# Patient Record
Sex: FEMALE | Race: WHITE | NOT HISPANIC OR LATINO | Employment: OTHER
[De-identification: names, ages, dates, MRNs, and addresses within clinical notes are randomized per-mention and may not be internally consistent; named-entity substitution may affect disease eponyms.]

---

## 2019-11-06 ENCOUNTER — RECORDS - HEALTHEAST (OUTPATIENT)
Dept: ADMINISTRATIVE | Facility: OTHER | Age: 79
End: 2019-11-06

## 2019-11-18 ENCOUNTER — HOSPITAL ENCOUNTER (OUTPATIENT)
Dept: MRI IMAGING | Facility: HOSPITAL | Age: 79
Discharge: HOME | End: 2019-11-18
Attending: PSYCHIATRY & NEUROLOGY

## 2019-11-18 ENCOUNTER — RECORDS - HEALTHEAST (OUTPATIENT)
Dept: LAB | Facility: HOSPITAL | Age: 79
End: 2019-11-18

## 2019-11-18 DIAGNOSIS — G30.9 ALZHEIMER'S DISEASE (H): ICD-10-CM

## 2019-11-18 DIAGNOSIS — F02.80 ALZHEIMER'S DISEASE (H): ICD-10-CM

## 2019-11-18 LAB
ANION GAP SERPL CALCULATED.3IONS-SCNC: 8 MMOL/L (ref 5–18)
BUN SERPL-MCNC: 20 MG/DL (ref 8–28)
CALCIUM SERPL-MCNC: 9.5 MG/DL (ref 8.5–10.5)
CHLORIDE BLD-SCNC: 109 MMOL/L (ref 98–107)
CO2 SERPL-SCNC: 26 MMOL/L (ref 22–31)
CREAT SERPL-MCNC: 0.95 MG/DL (ref 0.6–1.1)
GFR SERPL CREATININE-BSD FRML MDRD: 57 ML/MIN/1.73M2
GLUCOSE BLD-MCNC: 78 MG/DL (ref 70–125)
POTASSIUM BLD-SCNC: 4.3 MMOL/L (ref 3.5–5)
SODIUM SERPL-SCNC: 143 MMOL/L (ref 136–145)
TSH SERPL DL<=0.005 MIU/L-ACNC: 1.35 UIU/ML (ref 0.3–5)
VIT B12 SERPL-MCNC: 225 PG/ML (ref 213–816)

## 2020-01-08 ENCOUNTER — RECORDS - HEALTHEAST (OUTPATIENT)
Dept: LAB | Facility: HOSPITAL | Age: 80
End: 2020-01-08

## 2020-01-08 LAB — VIT B12 SERPL-MCNC: 1617 PG/ML (ref 213–816)

## 2021-01-01 ENCOUNTER — RECORDS - HEALTHEAST (OUTPATIENT)
Dept: LAB | Facility: CLINIC | Age: 81
End: 2021-01-01

## 2021-01-01 ENCOUNTER — LAB REQUISITION (OUTPATIENT)
Dept: LAB | Facility: CLINIC | Age: 81
End: 2021-01-01
Payer: MEDICARE

## 2021-01-01 ENCOUNTER — ASSISTED LIVING VISIT (OUTPATIENT)
Dept: GERIATRICS | Facility: CLINIC | Age: 81
End: 2021-01-01
Payer: MEDICARE

## 2021-01-01 ENCOUNTER — TELEPHONE (OUTPATIENT)
Dept: GERIATRICS | Facility: CLINIC | Age: 81
End: 2021-01-01

## 2021-01-01 VITALS
HEART RATE: 66 BPM | WEIGHT: 187 LBS | BODY MASS INDEX: 31.16 KG/M2 | DIASTOLIC BLOOD PRESSURE: 64 MMHG | OXYGEN SATURATION: 96 % | RESPIRATION RATE: 17 BRPM | SYSTOLIC BLOOD PRESSURE: 136 MMHG | TEMPERATURE: 98.5 F | HEIGHT: 65 IN

## 2021-01-01 VITALS
HEART RATE: 68 BPM | HEIGHT: 65 IN | TEMPERATURE: 97.2 F | SYSTOLIC BLOOD PRESSURE: 135 MMHG | BODY MASS INDEX: 24.16 KG/M2 | RESPIRATION RATE: 18 BRPM | WEIGHT: 145 LBS | DIASTOLIC BLOOD PRESSURE: 64 MMHG

## 2021-01-01 VITALS
HEART RATE: 62 BPM | DIASTOLIC BLOOD PRESSURE: 72 MMHG | SYSTOLIC BLOOD PRESSURE: 123 MMHG | WEIGHT: 164 LBS | HEIGHT: 65 IN | RESPIRATION RATE: 18 BRPM | OXYGEN SATURATION: 96 % | TEMPERATURE: 98.9 F | BODY MASS INDEX: 27.32 KG/M2

## 2021-01-01 VITALS — OXYGEN SATURATION: 94 % | BODY MASS INDEX: 27.32 KG/M2 | TEMPERATURE: 97.4 F | HEIGHT: 65 IN | WEIGHT: 164 LBS

## 2021-01-01 DIAGNOSIS — R44.3 HALLUCINATIONS: ICD-10-CM

## 2021-01-01 DIAGNOSIS — E03.9 HYPOTHYROIDISM, UNSPECIFIED TYPE: ICD-10-CM

## 2021-01-01 DIAGNOSIS — R79.89 ELEVATED LFTS: ICD-10-CM

## 2021-01-01 DIAGNOSIS — G30.1 LATE ONSET ALZHEIMER'S DISEASE WITH BEHAVIORAL DISTURBANCE (H): Primary | ICD-10-CM

## 2021-01-01 DIAGNOSIS — Z86.12 HISTORY OF POLIOMYELITIS: ICD-10-CM

## 2021-01-01 DIAGNOSIS — G30.1 LATE ONSET ALZHEIMER'S DISEASE WITH BEHAVIORAL DISTURBANCE (H): ICD-10-CM

## 2021-01-01 DIAGNOSIS — F02.818 LATE ONSET ALZHEIMER'S DISEASE WITH BEHAVIORAL DISTURBANCE (H): ICD-10-CM

## 2021-01-01 DIAGNOSIS — F02.818 LATE ONSET ALZHEIMER'S DISEASE WITH BEHAVIORAL DISTURBANCE (H): Primary | ICD-10-CM

## 2021-01-01 DIAGNOSIS — Z76.89 ENCOUNTER TO ESTABLISH CARE: Primary | ICD-10-CM

## 2021-01-01 DIAGNOSIS — B91 LATE EFFECTS OF ACUTE POLIOMYELITIS: ICD-10-CM

## 2021-01-01 DIAGNOSIS — K59.01 SLOW TRANSIT CONSTIPATION: ICD-10-CM

## 2021-01-01 DIAGNOSIS — M62.81 GENERALIZED MUSCLE WEAKNESS: ICD-10-CM

## 2021-01-01 DIAGNOSIS — Z11.59 ENCOUNTER FOR SCREENING FOR OTHER VIRAL DISEASES: ICD-10-CM

## 2021-01-01 DIAGNOSIS — I10 ESSENTIAL HYPERTENSION: ICD-10-CM

## 2021-01-01 DIAGNOSIS — Z51.5 HOSPICE CARE PATIENT: ICD-10-CM

## 2021-01-01 DIAGNOSIS — E78.5 HYPERLIPIDEMIA, UNSPECIFIED HYPERLIPIDEMIA TYPE: ICD-10-CM

## 2021-01-01 DIAGNOSIS — F02.818 DEMENTIA ASSOCIATED WITH OTHER UNDERLYING DISEASE WITH BEHAVIORAL DISTURBANCE (H): ICD-10-CM

## 2021-01-01 DIAGNOSIS — I10 ESSENTIAL HYPERTENSION: Primary | ICD-10-CM

## 2021-01-01 DIAGNOSIS — N39.0 URINARY TRACT INFECTION WITHOUT HEMATURIA, SITE UNSPECIFIED: ICD-10-CM

## 2021-01-01 DIAGNOSIS — Z53.9 ERRONEOUS ENCOUNTER--DISREGARD: Primary | ICD-10-CM

## 2021-01-01 DIAGNOSIS — E03.9 HYPOTHYROIDISM, UNSPECIFIED: ICD-10-CM

## 2021-01-01 DIAGNOSIS — R56.9 SEIZURES (H): Primary | ICD-10-CM

## 2021-01-01 DIAGNOSIS — Z51.5 HOSPICE CARE PATIENT: Primary | ICD-10-CM

## 2021-01-01 LAB
SARS-COV-2 PCR COMMENT: NORMAL
SARS-COV-2 RNA RESP QL NAA+PROBE: NEGATIVE
SARS-COV-2 RNA SPEC QL NAA+PROBE: NEGATIVE
SARS-COV-2 VIRUS SPECIMEN SOURCE: NORMAL
T4 FREE SERPL-MCNC: 1.17 NG/DL (ref 0.7–1.8)
TSH SERPL DL<=0.005 MIU/L-ACNC: 1.99 UIU/ML (ref 0.3–5)

## 2021-01-01 PROCEDURE — U0003 INFECTIOUS AGENT DETECTION BY NUCLEIC ACID (DNA OR RNA); SEVERE ACUTE RESPIRATORY SYNDROME CORONAVIRUS 2 (SARS-COV-2) (CORONAVIRUS DISEASE [COVID-19]), AMPLIFIED PROBE TECHNIQUE, MAKING USE OF HIGH THROUGHPUT TECHNOLOGIES AS DESCRIBED BY CMS-2020-01-R: HCPCS | Mod: ORL | Performed by: NURSE PRACTITIONER

## 2021-01-01 PROCEDURE — 99358 PROLONG SERVICE W/O CONTACT: CPT | Performed by: NURSE PRACTITIONER

## 2021-01-01 PROCEDURE — P9604 ONE-WAY ALLOW PRORATED TRIP: HCPCS | Mod: ORL | Performed by: GENERAL ACUTE CARE HOSPITAL

## 2021-01-01 PROCEDURE — 84439 ASSAY OF FREE THYROXINE: CPT | Mod: ORL | Performed by: GENERAL ACUTE CARE HOSPITAL

## 2021-01-01 PROCEDURE — 84443 ASSAY THYROID STIM HORMONE: CPT | Mod: ORL | Performed by: GENERAL ACUTE CARE HOSPITAL

## 2021-01-01 PROCEDURE — U0005 INFEC AGEN DETEC AMPLI PROBE: HCPCS | Mod: ORL

## 2021-01-01 PROCEDURE — 36415 COLL VENOUS BLD VENIPUNCTURE: CPT | Mod: ORL | Performed by: GENERAL ACUTE CARE HOSPITAL

## 2021-01-01 RX ORDER — HALOPERIDOL 0.5 MG/1
0.5 TABLET ORAL DAILY PRN
COMMUNITY
Start: 2021-01-01 | End: 2021-01-01

## 2021-01-01 RX ORDER — MORPHINE SULFATE 30 MG/1
2.5 TABLET ORAL
COMMUNITY
Start: 2021-01-01

## 2021-01-01 RX ORDER — CITALOPRAM HYDROBROMIDE 10 MG/1
TABLET ORAL
Qty: 28 TABLET | Refills: 10 | Status: SHIPPED | OUTPATIENT
Start: 2021-01-01

## 2021-01-01 RX ORDER — HALOPERIDOL 0.5 MG/1
0.5 TABLET ORAL EVERY 4 HOURS PRN
Start: 2021-01-01

## 2021-01-01 RX ORDER — LISINOPRIL 10 MG/1
10 TABLET ORAL DAILY
COMMUNITY
End: 2021-01-01

## 2021-01-01 RX ORDER — CEFDINIR 300 MG/1
300 CAPSULE ORAL 2 TIMES DAILY
COMMUNITY
Start: 2021-01-01 | End: 2021-01-01

## 2021-01-01 RX ORDER — QUETIAPINE FUMARATE 50 MG/1
25 TABLET, FILM COATED ORAL 2 TIMES DAILY
COMMUNITY
Start: 2021-01-01 | End: 2021-01-01

## 2021-01-01 RX ORDER — LEVOTHYROXINE SODIUM 75 UG/1
37.5 TABLET ORAL DAILY
COMMUNITY
Start: 2021-01-01

## 2021-01-01 RX ORDER — LEVOTHYROXINE SODIUM 50 UG/1
50 TABLET ORAL DAILY
COMMUNITY
End: 2021-01-01

## 2021-01-01 RX ORDER — CITALOPRAM HYDROBROMIDE 10 MG/1
10 TABLET ORAL DAILY
COMMUNITY
Start: 2021-01-01 | End: 2021-01-01

## 2021-01-01 RX ORDER — NAPROXEN SODIUM 220 MG
440 TABLET ORAL AT BEDTIME
COMMUNITY
End: 2021-01-01

## 2021-01-01 RX ORDER — SENNA AND DOCUSATE SODIUM 50; 8.6 MG/1; MG/1
1 TABLET, FILM COATED ORAL DAILY
Start: 2021-01-01

## 2021-01-01 RX ORDER — TRAZODONE HYDROCHLORIDE 50 MG/1
25 TABLET, FILM COATED ORAL EVERY MORNING
COMMUNITY
Start: 2021-01-01 | End: 2021-01-01

## 2021-01-01 RX ORDER — DIVALPROEX SODIUM 125 MG/1
125 CAPSULE, COATED PELLETS ORAL 3 TIMES DAILY
COMMUNITY
Start: 2021-01-01

## 2021-01-01 RX ORDER — ACETAMINOPHEN 325 MG/1
650 TABLET ORAL 2 TIMES DAILY
COMMUNITY

## 2021-01-01 RX ORDER — QUETIAPINE FUMARATE 50 MG/1
TABLET, FILM COATED ORAL
Start: 2021-01-01 | End: 2021-01-01

## 2021-01-01 RX ORDER — MIRTAZAPINE 15 MG/1
TABLET, FILM COATED ORAL
Qty: 28 TABLET | Refills: 10 | Status: SHIPPED | OUTPATIENT
Start: 2021-01-01

## 2021-01-01 RX ORDER — ATORVASTATIN CALCIUM 20 MG/1
20 TABLET, FILM COATED ORAL DAILY
COMMUNITY
End: 2021-01-01

## 2021-01-01 RX ORDER — QUETIAPINE FUMARATE 50 MG/1
25 TABLET, FILM COATED ORAL 2 TIMES DAILY
Start: 2021-01-01

## 2021-01-01 RX ORDER — LEVOCARNITINE 330 MG/1
TABLET ORAL
Qty: 270 TABLET | Refills: 10 | Status: SHIPPED | OUTPATIENT
Start: 2021-01-01 | End: 2021-01-01

## 2021-01-01 RX ORDER — QUETIAPINE FUMARATE 50 MG/1
50 TABLET, FILM COATED ORAL 2 TIMES DAILY
COMMUNITY
End: 2021-01-01

## 2021-01-01 ASSESSMENT — MIFFLIN-ST. JEOR
SCORE: 1219.78
SCORE: 1133.6
SCORE: 1219.78
SCORE: 1324.11

## 2021-05-26 PROBLEM — N39.0 UTI (URINARY TRACT INFECTION): Status: ACTIVE | Noted: 2021-01-01

## 2021-05-26 PROBLEM — R53.83 LETHARGY: Status: ACTIVE | Noted: 2021-01-01

## 2021-05-26 PROBLEM — R40.0 SOMNOLENCE: Status: ACTIVE | Noted: 2021-01-01

## 2021-05-26 PROBLEM — R79.89 ELEVATED LFTS: Status: ACTIVE | Noted: 2021-01-01

## 2021-05-26 PROBLEM — Z86.0100 PERSONAL HISTORY OF COLONIC POLYPS: Status: ACTIVE | Noted: 2021-01-01

## 2021-05-26 PROBLEM — F02.818 DEMENTIA ASSOCIATED WITH OTHER UNDERLYING DISEASE WITH BEHAVIORAL DISTURBANCE (H): Status: ACTIVE | Noted: 2019-12-18

## 2021-05-28 NOTE — LETTER
5/28/2021        RE: Val Arnold  [facility]  [address]  [facility] [address]        [place] [facility]  PRIMARY CARE PROVIDER AND CLINIC:  KENDRA Ledbetter CNP, [address] [facility] [address] / [place] [address]  Chief Complaint   Patient presents with     Establish Care     [place] Medical Record Number:  [id number]  Place of Service where encounter took place:  [facility] [[id number]]      HPI:    HPI information obtained from: facility chart records, facility staff, patient report, [facility] chart review and Care Everywhere [facility] chart review.     Brief Summary of Hospital Course:   Val Arnold  is a 81 year old  (1940) female with a medical history of polio with right sided deficits, dementia, HTN, HLD and hypothyroidism. She was previously living at home with her spouse but family wanted a secure environment for her so she moved to [facility] in 2/2020 but due to the pandemic she went home with him with the home instead three times per week. She began refusing to change her clothes and shower so [facility] had her move back to the [facility] She has had hallucinations, resistive to cares, and combative with staff. She is currently followed by Dr. Bone with [facility] in which virtual visits are done on a monthly basis. She had an ED visit on 3/10/21 for presumed UTI but anitbiotics were stopped after culture returned negative. While at the AL on 5/25, she developed decreased level of consciousness and hypotension therefore was send to the [facility] for further work up where she was found to have a UTI and was treated with ceftriaxone then transitioned to orals for discharge. Hospitalization was complicated by agitation, requiring a 1:1 staff member. Antihypertensives were held due to hypotension. She returned back to the [facility] on 5/27/21.     Updates on Status Since Skilled nursing  "Admission:   Ms. Arnold was visited today while in her room, sleeping in the bed. She does not recall being in the hospital nor having a bladder infection. She was lying on her stomach, did not want to move to her back to allow for vitals and full assessment. States that she does not have pain, asks to be left alone and to have the ability to sleep. \"If you hurt me, I'm going to hurt your ears.\" Care attendant was in the room, reports that she is generally up by this time (0845) and has been eating in her room. There is urine on the floor and pants are soiled with urine. Per staff, this has been happening more lately. She is generally up and walking around but as of late, has been using a wheelchair.     Spoke with Madiha for 24 minutes. He reports that Val has not had a madiha psyc inpatient stay but javier PCP, Dr. Lora recommended a geripsych provider as outpatient to help with hallucinations. Hayley first saw Dr. Bone in January 2020 but then all visits were virtual after that due to the pandemic. Madiha has felt that Hayley was sleeping too much so depakote was cut in half last week. Per , he got her to the bathroom last night with hand held assist but she was walking okay one week ago. He has noted her to not been eating well for the past few days. He is here with her for lunch and evening meal due to her behaviors. He has been worried that she wouldn't be able to stay in the memory care if her behaviors weren't controlled so he is at the facility often as she is less agitated when he is around. He is open to medication changes and hopeful that she will be less agitated. He reports her to have been pleasant one minute then escalates quickly at times. She has anxiety as well. To his knowledge, she has never been on a selective serotonin reuptake inhibitor or mirtazapine. He thinks she sleeps well at night but is also sleeping throughout the day. We discussed decreasing the thyroid medication which he " agrees with, and rechecking the liver enzymes in the next couple of weeks to ensure they are trending downward. If they are not, we may need to stop the tylenol but due to the low dose she is on, should be okay at this time. We discussed stopping the naproxen due to bleeding and kidney risks, he agrees. He is not sure where her pain is but she has always had some discomfort related to her deficits secondary to having polio. He wishes for her to remain a FULL code at this time.     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  PAST SURGICAL HISTORY:   has no past surgical history on file.  FAMILY HISTORY: family history includes Alcoholism in her sister; Alzheimer Disease in her mother; Breast Cancer in her maternal grandmother; Heart Disease in her father; Obesity in her sister.  SOCIAL HISTORY:   reports that she has never smoked. She has never used smokeless tobacco. She reports previous alcohol use. She reports that she does not use drugs.    Post Discharge Medication Reconciliation Status: discharge medications reconciled and changed, per note/orders    Current Outpatient Medications   Medication Sig Dispense Refill     acetaminophen (TYLENOL) 325 MG tablet Take 650 mg by mouth 2 times daily And 650 mg twice daily as needed       atorvastatin (LIPITOR) 20 MG tablet Take 20 mg by  "mouth daily       cefdinir (OMNICEF) 300 MG capsule Take 300 mg by mouth 2 times daily       divalproex sodium delayed-release (DEPAKOTE SPRINKLE) 125 MG DR capsule Take 125 mg by mouth 3 times daily       levothyroxine (SYNTHROID/LEVOTHROID) 75 MCG tablet Take 0.5 tablets (37.5 mcg) by mouth daily       lisinopril (ZESTRIL) 10 MG tablet Take 10 mg by mouth daily       QUEtiapine (SEROQUEL) 50 MG tablet Take 50 mg by mouth 2 times daily And 50 mg once daily as needed       vitamin B-12 (CYANOCOBALAMIN) 500 MCG tablet Take 500 mcg by mouth daily         ROS:  Limited secondary to cognitive impairment but today pt reports as noted above.     Vitals:  /72   Pulse 62   Temp 98.9  F (37.2  C)   Resp 18   Ht 1.651 m (5' 5\")   Wt 74.4 kg (164 lb)   SpO2 96%   BMI 27.29 kg/m   as she was uncooperative.   Exam:  GENERAL APPEARANCE:  Alert, in no distress, uncooperative.   EYES: unable to visualize  ENT: hearing acuity intact  NECK: supple, symmetrical  RESP: no respiratory distress, Lung sounds clear, patient is on room air  CV:  rate and rhythm regular (heard from posterior). Edema trace in bilateral lower extremities. VASCULAR: warm extremities without open areas.  ABDOMEN: unable to assess  M/S:   Gait and station: per staff has been using the wheelchair, able to move all extremities   SKIN:  Inspection and palpation of skin and subcutaneous tissue: skin warm, dry and intact without rashes but exam is limited  NEURO: no speech deficits and able to follow directions, moves all extremities symmetrically  PSYCH:  insight, judgement, and memory impaired, affect and mood irritable    Lab/Diagnostic data:  From hospitalization on 5/26/21:  Mg 2.0  Na 144  K 3.9  Cl 113  Ca 8.3  Creatinine 0.79  BUN 19  Albumin 3.0  Protein 5.7    AST 62  TSH 0.64  HGB 11.4    MRI 2019:  INTRACRANIAL CONTENTS: No acute or subacute infarct. No mass, acute hemorrhage, or focal, extra-axial fluid collections. Scattered " nonspecific T2/FLAIR hyperintensities within the cerebral white matter most consistent with mild chronic microvascular   ischemic change. Moderate, diffuse cerebral atrophy is present. This is slightly more pronounced involving the parietal and temporal lobes but there is substantial frontal and occipital involvement as well. Bilateral hippocampal atrophy noted without   evidence of sclerosis or mass. Normal position of the cerebellar tonsils.       ASSESSMENT/PLAN:  Alzheimer's Dementia with psychosis  hallucinations  Is progressing and now severe. SLUMS is listed as 0/30 in the [facility] system but wonder if she wasn't able to participate. Followed by geriatric psychiatrist, Andrzej Bone. She was started on depakote and has had some sleepiness with it. As of late, trazadone has been administered prior to morning cares to help with combativeness but she continues to remain intermittently combative. She is incontinent of urine today and spouse noted her to be swallowing more slowly last night so wonder if she has acute delirium from the UTI and recent hospitalization?  I would like to try mirtazapine to help with some of the irritability so will call Dr. Bone.   --continue with seroquel 50 mg BID and once daily PRN  --depakote 125 mg TID  --trazadone 25 mg PO prior to morning cares  --continue with memory care assisted living for assistance with cares, meals and medications.   --would favor mirtazapine but will hold off on ordering until hearing back from Dr. Bone    Addendum:  Received a phone call from Dr. Brand office that she is okay with starting the mirtazapine.   --will discontinue trazadone  --start mirtazapine 15 mg daily  --she is due for a follow with psych on June 15th.     Urinary tract infection  Treated with ceftriaxone which was transitioned to cefdinir for discharge. She had incontinent episode which the UTI may be contributing to.  --continue with cefdinir 300 mg bID with last day on 6/1/21  --encourage  fluids    Hypertension  Hyperlipidemia  Unable to obtain blood pressure today due to uncooperative but lisinopril was held during the hospitalization due to soft pressures.   --will ask staff to obtain a BP in the next few days and adjust the lisinopril as needed but will keep it for now.   --continue with lisinopril 10 mg daily  --atorvastatin 20 mg daily  --Continue to monitor blood pressure and heart rate, adjust medications as needed.    Hypothyroidism  TSH of 0.64 on 5/25/21 which may be contributing to anxiety and combativeness.   --decrease levothyroxine to 37.5 mcg per day  --recheck TSH in 6 weeks    Elevated LFTS  Wonder if depakote is causing this. Will recheck and if needed, consider stopping the depakote.   --recheck on next lab day    Hx of Polio with right sided deficits  weakness  Unable to assess today but per staff, she is more weak which is also in part due to recent hospitalizations and UTI.   --will ask PT/OT to see her    Electronically signed by:  KENDRA Ledbetter CNP     [place]  NON-FACE TO FACE PROLONGED SERVICE    Val Arnold 1940    Date of Related Face to Face Service: 5/28/21    INTENT OF SERVICE  This is an extended evaluation of patient's specific problem.  The service relates to a recent or future E/M visit.  Documentation supports medical necessity, relates to ongoing patient management and documents start times and stop times.    Regarding the following diagnoses:     Encounter to establish care  Late onset Alzheimer's disease with behavioral disturbance (H)  Hallucinations  Urinary tract infection without hematuria, site unspecified  Late effects of acute poliomyelitis  Essential hypertension  Hyperlipidemia, unspecified hyperlipidemia type  Elevated LFTs  Generalized muscle weakness,   I spoke with Madiha who is Val's spouse from 9:01 to 9:25  * I coordinated care with Dr. Bone's nurse facility member.  (Start time 9:27 end time  9:34)    ASSESSMENT/PLAN  Spoke with Madiha for 24 minutes. He reports that Val has not had a madiha psyc inpatient stay but javier PCP, Dr. Lora recommended a geripsych provider as outpatient to help with hallucinations. Hayley first saw Dr. Bone in January 2020 but then all visits were virtual after that due to the pandemic. Madiha has felt that Hayley was sleeping too much so depakote was cut in half last week. Per , he got her to the bathroom last night with hand held assist but she was walking okay one week ago. He has noted her to not been eating well for the past few days. He is here with her for lunch and evening meal due to her behaviors. He has been worried that she wouldn't be able to stay in the memory care if her behaviors weren't controlled so he is at the facility often as she is less agitated when he is around. He is open to medication changes and hopeful that she will be less agitated. He reports her to have been pleasant one minute then escalates quickly at times. She has anxiety as well. To his knowledge, she has never been on a selective serotonin reuptake inhibitor or mirtazapine. He thinks she sleeps well at night but is also sleeping throughout the day. We discussed decreasing the thyroid medication which he agrees with, and rechecking the liver enzymes in the next couple of weeks to ensure they are trending downward. If they are not, we may need to stop the tylenol but due to the low dose she is on, should be okay at this time. We discussed stopping the naproxen due to bleeding and kidney risks, he agrees. He is not sure where her pain is but she has always had some discomfort related to her deficits secondary to having polio. He wishes for her to remain a FULL code at this time.     TIME  Total time spent with Non-Face to Face prolonged service 31 minutes.     Electronically signed by:  KENDRA Ledbetter CNP                            Sincerely,        KENDRA Ledbetter  CNP

## 2021-05-28 NOTE — PROGRESS NOTES
[place] [facility]  PRIMARY CARE PROVIDER AND CLINIC:  Milena Gusman, KENDRA CNP, [address] / [place] [address]  Chief Complaint   Patient presents with     [facility]     [place] Medical Record Number:  [id number]  Place of Service where encounter took place:  [facility] [[id number]]      HPI:    HPI information obtained from: facility chart records, facility staff, patient report, [facility] chart review and Care Everywhere [facility] chart review.     Brief Summary of Hospital Course:   Val Arnold  is a 81 year old  (1940) female with a medical history of polio with right sided deficits, dementia, HTN, HLD and hypothyroidism. She was previously living at home with her spouse but family wanted a secure environment for her so she moved to [facility] in 2/2020 but due to the pandemic she went home with him with the home instead three times per week. She began refusing to change her clothes and shower so [facility] had her move back to the [facility] She has had hallucinations, resistive to cares, and combative with staff. She is currently [facility] in which virtual visits are done on a monthly basis. She had an ED visit on 3/10/21 for presumed UTI but anitbiotics were stopped after culture returned negative. While at the AL on 5/25, she developed decreased level of consciousness and hypotension therefore was send to the [place] ED for further work up where she was found to have a UTI and was treated with ceftriaxone then transitioned to orals for discharge. Hospitalization was complicated by agitation, requiring a 1:1 staff member. Antihypertensives were held due to hypotension. She returned back to the [facility] on 5/27/21.     Updates on Status Since Skilled nursing Admission:   Ms. Arnold was visited today while in her room, sleeping in the bed. She does not recall being in the  "hospital nor having a bladder infection. She was lying on her stomach, did not want to move to her back to allow for vitals and full assessment. States that she does not have pain, asks to be left alone and to have the ability to sleep. \"If you hurt me, I'm going to hurt your ears.\" Care attendant was in the room, reports that she is generally up by this time (0845) and has been eating in her room. There is urine on the floor and pants are soiled with urine. Per staff, this has been happening more lately. She is generally up and walking around but as of late, has been using a wheelchair.     Spoke with Madiha for 24 minutes. He reports that Val has not had a madiha psyc inpatient stay but javier PCP, Dr. Lora recommended a geripsych provider as outpatient to help with hallucinations. Hayley first saw Dr. Bone in January 2020 but then all visits were virtual after that due to the pandemic. Madiha has felt that Hayley was sleeping too much so depakote was cut in half last week. Per , he got her to the bathroom last night with hand held assist but she was walking okay one week ago. He has noted her to not been eating well for the past few days. He is here with her for lunch and evening meal due to her behaviors. He has been worried that she wouldn't be able to stay in the memory care if her behaviors weren't controlled so he is at the facility often as she is less agitated when he is around. He is open to medication changes and hopeful that she will be less agitated. He reports her to have been pleasant one minute then escalates quickly at times. She has anxiety as well. To his knowledge, she has never been on a selective serotonin reuptake inhibitor or mirtazapine. He thinks she sleeps well at night but is also sleeping throughout the day. We discussed decreasing the thyroid medication which he agrees with, and rechecking the liver enzymes in the next couple of weeks to ensure they are trending downward. If " they are not, we may need to stop the tylenol but due to the low dose she is on, should be okay at this time. We discussed stopping the naproxen due to bleeding and kidney risks, he agrees. He is not sure where her pain is but she has always had some discomfort related to her deficits secondary to having polio. He wishes for her to remain a FULL code at this time.     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  PAST SURGICAL HISTORY:   has no past surgical history on file.  FAMILY HISTORY: family history includes Alcoholism in her sister; Alzheimer Disease in her mother; Breast Cancer in her maternal grandmother; Heart Disease in her father; Obesity in her sister.  SOCIAL HISTORY:   reports that she has never smoked. She has never used smokeless tobacco. She reports previous alcohol use. She reports that she does not use drugs.    Post Discharge Medication Reconciliation Status: discharge medications reconciled and changed, per note/orders    Current Outpatient Medications   Medication Sig Dispense Refill     acetaminophen (TYLENOL) 325 MG tablet Take 650 mg by mouth 2 times daily And 650 mg twice daily as needed       atorvastatin (LIPITOR) 20 MG tablet Take 20 mg by mouth daily       cefdinir (OMNICEF) 300 MG capsule Take 300 mg by mouth 2 times daily       divalproex sodium  "delayed-release (DEPAKOTE SPRINKLE) 125 MG DR capsule Take 125 mg by mouth 3 times daily       levothyroxine (SYNTHROID/LEVOTHROID) 75 MCG tablet Take 0.5 tablets (37.5 mcg) by mouth daily       lisinopril (ZESTRIL) 10 MG tablet Take 10 mg by mouth daily       QUEtiapine (SEROQUEL) 50 MG tablet Take 50 mg by mouth 2 times daily And 50 mg once daily as needed       vitamin B-12 (CYANOCOBALAMIN) 500 MCG tablet Take 500 mcg by mouth daily         ROS:  Limited secondary to cognitive impairment but today pt reports as noted above.     Vitals:  /72   Pulse 62   Temp 98.9  F (37.2  C)   Resp 18   Ht 1.651 m (5' 5\")   Wt 74.4 kg (164 lb)   SpO2 96%   BMI 27.29 kg/m   as she was uncooperative.   Exam:  GENERAL APPEARANCE:  Alert, in no distress, uncooperative.   EYES: unable to visualize  ENT: hearing acuity intact  NECK: supple, symmetrical  RESP: no respiratory distress, Lung sounds clear, patient is on room air  CV:  rate and rhythm regular (heard from posterior). Edema trace in bilateral lower extremities. VASCULAR: warm extremities without open areas.  ABDOMEN: unable to assess  M/S:   Gait and station: per staff has been using the wheelchair, able to move all extremities   SKIN:  Inspection and palpation of skin and subcutaneous tissue: skin warm, dry and intact without rashes but exam is limited  NEURO: no speech deficits and able to follow directions, moves all extremities symmetrically  PSYCH:  insight, judgement, and memory impaired, affect and mood irritable    Lab/Diagnostic data:  From hospitalization on 5/26/21:  Mg 2.0  Na 144  K 3.9  Cl 113  Ca 8.3  Creatinine 0.79  BUN 19  Albumin 3.0  Protein 5.7    AST 62  TSH 0.64  HGB 11.4    MRI 2019:  INTRACRANIAL CONTENTS: No acute or subacute infarct. No mass, acute hemorrhage, or focal, extra-axial fluid collections. Scattered nonspecific T2/FLAIR hyperintensities within the cerebral white matter most consistent with mild chronic microvascular "   ischemic change. Moderate, diffuse cerebral atrophy is present. This is slightly more pronounced involving the parietal and temporal lobes but there is substantial frontal and occipital involvement as well. Bilateral hippocampal atrophy noted without   evidence of sclerosis or mass. Normal position of the cerebellar tonsils.       ASSESSMENT/PLAN:  Alzheimer's Dementia with psychosis  hallucinations  Is progressing and now severe. SLUMS is listed as 0/30 in the [facility] system but wonder if she wasn't able to participate. Followed by geriatric psychiatrist, Andrzej Bone. She was started on depakote and has had some sleepiness with it. As of late, trazadone has been administered prior to morning cares to help with combativeness but she continues to remain intermittently combative. She is incontinent of urine today and spouse noted her to be swallowing more slowly last night so wonder if she has acute delirium from the UTI and recent hospitalization?  I would like to try mirtazapine to help with some of the irritability so will call Dr. Bone.   --continue with seroquel 50 mg BID and once daily PRN  --depakote 125 mg TID  --trazadone 25 mg PO prior to morning cares  --continue with memory care assisted living for assistance with cares, meals and medications.   --would favor mirtazapine but will hold off on ordering until hearing back from Dr. Bone    Addendum:  Received a phone call from Dr. Brand office that she is okay with starting the mirtazapine.   --will discontinue trazadone  --start mirtazapine 15 mg daily  --she is due for a follow with psych on June 15th.     Urinary tract infection  Treated with ceftriaxone which was transitioned to cefdinir for discharge. She had incontinent episode which the UTI may be contributing to.  --continue with cefdinir 300 mg bID with last day on 6/1/21  --encourage fluids    Hypertension  Hyperlipidemia  Unable to obtain blood pressure today due to uncooperative but lisinopril was  held during the hospitalization due to soft pressures.   --will ask staff to obtain a BP in the next few days and adjust the lisinopril as needed but will keep it for now.   --continue with lisinopril 10 mg daily  --atorvastatin 20 mg daily  --Continue to monitor blood pressure and heart rate, adjust medications as needed.    Hypothyroidism  TSH of 0.64 on 5/25/21 which may be contributing to anxiety and combativeness.   --decrease levothyroxine to 37.5 mcg per day  --recheck TSH in 6 weeks    Elevated LFTS  Wonder if depakote is causing this. Will recheck and if needed, consider stopping the depakote.   --recheck on next lab day    Hx of Polio with right sided deficits  weakness  Unable to assess today but per staff, she is more weak which is also in part due to recent hospitalizations and UTI.   --will ask PT/OT to see her    Electronically signed by:  KENDRA Ledbetter CNP     [place] [facility]  NON-FACE TO FACE PROLONGED SERVICE    Val Arnold 1940    Date of Related Face to Face Service: 5/28/21    INTENT OF SERVICE  This is an extended evaluation of patient's specific problem.  The service relates to a recent or future E/M visit.  Documentation supports medical necessity, relates to ongoing patient management and documents start times and stop times.    Regarding the following diagnoses:     Encounter to establish care  Late onset Alzheimer's disease with behavioral disturbance (H)  Hallucinations  Urinary tract infection without hematuria, site unspecified  Late effects of acute poliomyelitis  Essential hypertension  Hyperlipidemia, unspecified hyperlipidemia type  Elevated LFTs  Generalized muscle weakness,   I spoke with Madiha who is Val's spouse from 9:01 to 9:25  * I coordinated care with Dr. Bone's nurse facility member.  (Start time 9:27 end time 9:34)    ASSESSMENT/PLAN  Spoke with Madiha for 24 minutes. He reports that Val has not had a madiha psyc inpatient stay but javier  PCP, [facility] to help with hallucinations. Hayley first saw Dr. Bone in January 2020 but then all visits were virtual after that due to the pandemic. Madiha has felt that Hayley was sleeping too much so depakote was cut in half last week. Per , he got her to the bathroom last night with hand held assist but she was walking okay one week ago. He has noted her to not been eating well for the past few days. He is here with her for lunch and evening meal due to her behaviors. He has been worried that she wouldn't be able to stay in the memory care if her behaviors weren't controlled so he is at the facility often as she is less agitated when he is around. He is open to medication changes and hopeful that she will be less agitated. He reports her to have been pleasant one minute then escalates quickly at times. She has anxiety as well. To his knowledge, she has never been on a selective serotonin reuptake inhibitor or mirtazapine. He thinks she sleeps well at night but is also sleeping throughout the day. We discussed decreasing the thyroid medication which he agrees with, and rechecking the liver enzymes in the next couple of weeks to ensure they are trending downward. If they are not, we may need to stop the tylenol but due to the low dose she is on, should be okay at this time. We discussed stopping the naproxen due to bleeding and kidney risks, he agrees. He is not sure where her pain is but she has always had some discomfort related to her deficits secondary to having polio. He wishes for her to remain a FULL code at this time.     TIME  Total time spent with Non-Face to Face prolonged service 31 minutes.     Electronically signed by:  KENDRA Ledbetter CNP

## 2021-05-28 NOTE — LETTER
5/28/2021        RE: Val Arnold  [facility]  [address]  [facility] [address]        [place] [facility]  PRIMARY CARE PROVIDER AND CLINIC:  KENDRA Ledbetter CNP, [address] [facility] [address] / [place] [address]  No chief complaint on file.    [place] Medical Record Number:  [id number]  Place of Service where encounter took place:  No question data found.      HPI:    HPI information obtained from: facility chart records, facility staff, patient report, [facility] chart review and Care Everywhere Psychiatric chart review.     Brief Summary of Hospital Course:   Val Arnold  is a 81 year old  (1940) female with a medical history of polio with right sided deficits, dementia, HTN, HLD and hypothyroidism. She was previously living at home with her spouse but family wanted a secure environment for her so she moved to [facility] in 2/2020. She has had hallucinations, resistive to cares, and combative with staff. She is currently followed by Dr. Bone with [facility] in which virtual visits are done on a monthly basis. She had an ED visit on 3/10/21 for presumed UTI but anitbiotics were stopped after culture returned negative. While at the AL on 5/25, she developed decreased level of consciousness and hypotension therefore was send to the [place] ED for further work up where she was found to have a UTI and was treated with ceftriaxone then transitioned to orals for discharge. Hospitalization was complicated by agitation, requiring a 1:1 staff member. Antihypertensives were held due to hypotension. She returned back to the [facility] on 5/27/21.  {fgsinitialoption:343352}.  Admitted to this facility for  {FGS ADMISSION REASONS:008281}.    Updates on Status Since Skilled nursing Admission: ***    CODE STATUS/ADVANCE DIRECTIVES DISCUSSION:   {CODE STATUS:148992}  Patient's living condition: {LIVES WITH (NURSING HOME):447228}  ALLERGIES: Patient has no  allergy information on record.  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  PAST SURGICAL HISTORY:   has no past surgical history on file.  FAMILY HISTORY: family history includes Alcoholism in her sister; Alzheimer Disease in her mother; Breast Cancer in her maternal grandmother; Heart Disease in her father; Obesity in her sister.  SOCIAL HISTORY:   reports that she has never smoked. She has never used smokeless tobacco. She reports previous alcohol use. She reports that she does not use drugs.    Post Discharge Medication Reconciliation Status: {ACO Med Rec (Provider):725353}  ***  No current outpatient medications on file.       ROS:  {ROS FGS:179896}    Vitals:  There were no vitals taken for this visit.  Exam:  GENERAL APPEARANCE:  Alert, in no distress, pleasant, cooperative  EYES: no discharge or mattering on lids or lashes noted  ENT:  moist mucous membranes, hearing acuity intact  NECK: supple, symmetrical  RESP: no respiratory distress, Lung sounds clear, patient is on room air  CV:  rate and rhythm regular, no murmur. Edema *** in bilateral lower extremities. VASCULAR: warm extremities without open areas.  ABDOMEN: normal bowel sounds, soft, nontender.  M/S:   Gait and station: ***, no tenderness or swelling of the joints; able to move all extremities   SKIN:  Inspection and palpation of skin and subcutaneous tissue: skin warm, dry and intact without rashes  NEURO: no facial asymmetry, no speech deficits and able to  follow directions, moves all extremities symmetrically  PSYCH:  insight, judgement, and memory impaired affect and mood normal      Lab/Diagnostic data:  From hospitalization on 5/26/21:  Mg 2.0  Na 144  K 3.9  Cl 113  Ca 8.3  Creatinine 0.79  BUN 19  Albumin 3.0  Protein 5.7    AST 62  TSH 0.64  HGB 11.4    MRI 2019:  INTRACRANIAL CONTENTS: No acute or subacute infarct. No mass, acute hemorrhage, or focal, extra-axial fluid collections. Scattered nonspecific T2/FLAIR hyperintensities within the cerebral white matter most consistent with mild chronic microvascular   ischemic change. Moderate, diffuse cerebral atrophy is present. This is slightly more pronounced involving the parietal and temporal lobes but there is substantial frontal and occipital involvement as well. Bilateral hippocampal atrophy noted without   evidence of sclerosis or mass. Normal position of the cerebellar tonsils.       ASSESSMENT/PLAN:  Alzheimer's Dementia with psychosis  hallucinations  Moderate to severe dementia. Followed by geriatric psychiatrist, Andrzej Bone. She was started on depakote and has had some sleepiness with it. As of late, trazadone has been administered prior to morning cares to help with combativeness. I would like to try citalopram or mirtazapine to help with mood and anxiety but given she has a psychiatrist, will reach out to him in this regard.   --continue with seroquel 50 mg BID and once daily PRN  --depakote 125 mg TID  --trazadone 25 mg PO prior to morning cares  --continue with memory care assisted living for assistance with cares, meals and medications.   --would favor citalopram or mirtazapine.     Urinary tract infection  Treated with ceftriaxone which was transitioned to cefdinir for discharge.   --continue with cefdinir 300 mg bID with last day on 6/1/21  --encourage fluids    Hypertension  Hyperlipidemia  --continue with lisinopril 10 mg daily  --atorvastatin 20 mg daily  --Continue to monitor blood  pressure and heart rate, adjust medications as needed.    Hypothyroidism  TSH of 0.64 on 5/25/21 which may be contributing to anxiety and combativeness.   --decrease levothyroxine to 37.5 mcg per day  --recheck TSH in 6 weeks    Elevated LFTS  --recheck on next lab day    Hx of Polio with right sided deficits        Total time spent with patient visit at the skilled nursing facility was {1/2/3/4/5:096483} including {1/2/3/4/5:629548}. Greater than 50% of total time spent with counseling and coordinating care due to ***.     Electronically signed by:  KENDRA Ledbetter CNP                     [place] [facility]  PRIMARY CARE PROVIDER AND CLINIC:  KENDRA Ledbetter CNP, [address] [address] / [place] [address]  No chief complaint on file.    [place] Medical Record Number:  [id number]  Place of Service where encounter took place:  No question data found.      HPI:    HPI information obtained from: facility chart records, facility staff, patient report, [facility] chart review and Care Everywhere [facility] chart review.     Brief Summary of Hospital Course:   Val Arnold  is a 81 year old  (1940) female with a medical history of polio with right sided deficits, dementia, HTN, HLD and hypothyroidism. She was previously living at home with her spouse but family wanted a secure environment for her so she moved to [facility] in 2/2020 but due to the pandemic she went home with him with the home instead three times per week. She began refusing to change her clothes and shower so [facility] had her move back to the [facility] She has had hallucinations, resistive to cares, and combative with staff. She is currently followed by Dr. Bone with [facility] psych in which virtual visits are done on a monthly basis. She had an ED visit on 3/10/21 for presumed UTI but anitbiotics were stopped after culture returned negative. While at the AL on 5/25, she developed decreased level of  "consciousness and hypotension therefore was send to the [place] ED for further work up where she was found to have a UTI and was treated with ceftriaxone then transitioned to orals for discharge. Hospitalization was complicated by agitation, requiring a 1:1 staff member. Antihypertensives were held due to hypotension. She returned back to the [facility] on 5/27/21.  {fgsinitialoption:095651}.  Admitted to this facility for  {FGS ADMISSION REASONS:566052}.    Updates on Status Since Skilled nursing Admission:   Ms. Arnold was visited today while in her room, sleeping in the bed. She does not recall being in the hospital nor having a bladder infection. She was lying on her stomach, did not want to move to her back to allow for vitals and full assessment. States that she does not have pain, asks to be left alone and to have the ability to sleep. \"If you hurt me, I'm going to hurt your ears.\" Care attendant was in the room, reports that she is generally up by this time (0845) and has been eating in her room. There is urine on the floor and pants are soiled with urine. Per staff, this has been happening more lately. She is generally up and walking around but as of late, has been using a wheelchair.     Has   Dr. Lora recommened  vandana ans has been seen for the past year but she has been all virutal due to the pandemic. He felt that she was sleeping too much so depkate was cut in half last week. Per , he got her to the [facility] with hand held assist but was walking okay one week ago. He has noted her to not been eating well for the past few days. He is here with her for lunch and evening meal due to her behaviors. He has been worried that she wouldn't be able to stay in the [facility] if her behaviors weren't controlled so he is at the facility often as she is less agitated when he is around. He is open to medication changes and hopeful that she will be less agitated. He reports her to have been pleasant " one minute then escalates quickly at times. She has anxiety as well. To his knowledge, she has never been on a selective serotonin reuptake inhibitor or mirtazapine. He thinks she sleeps wekk but is also sleeping throughout the day.     CODE STATUS/ADVANCE DIRECTIVES DISCUSSION:   {CODE STATUS:973268}  Patient's living condition: {LIVES WITH (NURSING HOME):504794}  ALLERGIES: Patient has no allergy information on record.  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  PAST SURGICAL HISTORY:   has no past surgical history on file.  FAMILY HISTORY: family history includes Alcoholism in her sister; Alzheimer Disease in her mother; Breast Cancer in her maternal grandmother; Heart Disease in her father; Obesity in her sister.  SOCIAL HISTORY:   reports that she has never smoked. She has never used smokeless tobacco. She reports previous alcohol use. She reports that she does not use drugs.    Post Discharge Medication Reconciliation Status: {ACO Med Rec (Provider):782328}  ***  No current outpatient medications on file.       ROS:  Limited secondary to cognitive impairment but today pt reports as noted above.     Vitals:  There were no vitals taken for this visit. as she was uncooperative.   Exam:  GENERAL APPEARANCE:  Alert, in no distress, uncooperative.   EYES: unable to visualize  ENT: hearing acuity intact  NECK: supple, symmetrical  RESP: no respiratory distress, Lung sounds clear, patient is on room air  CV:  rate and rhythm  regular (heard from posterior). Edema trace in bilateral lower extremities. VASCULAR: warm extremities without open areas.  ABDOMEN: unable to assess  M/S:   Gait and station: per staff has been using the wheelchair, able to move all extremities   SKIN:  Inspection and palpation of skin and subcutaneous tissue: skin warm, dry and intact without rashes but exam is limited  NEURO: no speech deficits and able to follow directions, moves all extremities symmetrically  PSYCH:  insight, judgement, and memory impaired, affect and mood irritable    Lab/Diagnostic data:  From hospitalization on 5/26/21:  Mg 2.0  Na 144  K 3.9  Cl 113  Ca 8.3  Creatinine 0.79  BUN 19  Albumin 3.0  Protein 5.7    AST 62  TSH 0.64  HGB 11.4    MRI 2019:  INTRACRANIAL CONTENTS: No acute or subacute infarct. No mass, acute hemorrhage, or focal, extra-axial fluid collections. Scattered nonspecific T2/FLAIR hyperintensities within the cerebral white matter most consistent with mild chronic microvascular   ischemic change. Moderate, diffuse cerebral atrophy is present. This is slightly more pronounced involving the parietal and temporal lobes but there is substantial frontal and occipital involvement as well. Bilateral hippocampal atrophy noted without   evidence of sclerosis or mass. Normal position of the cerebellar tonsils.       ASSESSMENT/PLAN:  Alzheimer's Dementia with psychosis  hallucinations  Moderate to severe dementia. Followed by geriatric psychiatrist, Andrzej Bone. She was started on depakote and has had some sleepiness with it. As of late, trazadone has been administered prior to morning cares to help with combativeness. I would like to try citalopram or mirtazapine to help with mood and anxiety but given she has a psychiatrist, will reach out to him in this regard.   --continue with seroquel 50 mg BID and once daily PRN  --depakote 125 mg TID  --trazadone 25 mg PO prior to morning cares  --continue with memory care assisted  living for assistance with cares, meals and medications.   --would favor citalopram or mirtazapine.     Urinary tract infection  Treated with ceftriaxone which was transitioned to cefdinir for discharge.   --continue with cefdinir 300 mg bID with last day on 6/1/21  --encourage fluids    Hypertension  Hyperlipidemia  --continue with lisinopril 10 mg daily  --atorvastatin 20 mg daily  --Continue to monitor blood pressure and heart rate, adjust medications as needed.    Hypothyroidism  TSH of 0.64 on 5/25/21 which may be contributing to anxiety and combativeness.   --decrease levothyroxine to 37.5 mcg per day  --recheck TSH in 6 weeks    Elevated LFTS  --recheck on next lab day    Hx of Polio with right sided deficits  weakness  Unable to assess today but per staff, she is more weak which is also in part due to recent hospitalizations and UTI.   --      Total time spent with patient visit at the skilled nursing facility was {1/2/3/4/5:051817} including {1/2/3/4/5:162670}. Greater than 50% of total time spent with counseling and coordinating care due to ***.     Electronically signed by:  KENDRA Ledbetter CNP                         Sincerely,        KENDRA Ledbetter CNP

## 2021-06-07 NOTE — TELEPHONE ENCOUNTER
FGS Nurse Triage Telephone Note    Provider: Milena Gusman NP  Facility: [facility]   Facility Type:  AL    Caller: Meghana MORALES HC OT  Call Back Number: [phone]    Allergies   Allergen Reactions     Codeine Unknown       Reason for call: Requesting VO for extension of OT visits 1w4.    Verbal Order/Direction given by Provider: MCS STANDING ORDER GIVEN:  Start/Continue in house Home Care services if requested by facility or home care agency    Provider giving Order:  Milena Gusman NP    Verbal Order given to: Meghana Lay RN

## 2021-06-16 NOTE — LETTER
6/16/2021        RE: Val Arnold  [facility]  [address] [address]        [place] [facility]  [place] Medical Record Number:  [id number]  Place of Service where encounter took place:  [facility] [place] [address] [employer] [facility] [[id number]]  Chief Complaint   Patient presents with     RECHECK       HPI:    Val Arnold  is a 81 year old (1940), who is being seen today for an episodic care visit.  HPI information obtained from: facility chart records, facility staff, patient report and family/first contact Jose report.    Ms. Arnold was visited today while in her room, sitting in the recliner,  present and sitting on the couch. Jose provides much of the history due to advancing dementia. He feels that behaviors may be improving slightly but notes she continues to be combative during cares. She is still sleepy throughout the day and often falls asleep after evening meal. She is able to ambulate with his hand held assist to the dining room from her room. Val denies pain. Speaks throughout our visit but it does not pertain to the conversation.     Spoke with PT today who reports that due to Val not participating during sessions, they will be discharging at this time. They note that she does have good strength but is not interested in doing sessions. During their session today, she was noted to be sitting on the floor and asked to be left there until her  came to the facility.      Past Medical and Surgical History reviewed in Epic today.    MEDICATIONS:  Current Outpatient Medications   Medication Sig Dispense Refill     acetaminophen (TYLENOL) 325 MG tablet Take 650 mg by mouth 2 times daily And 650 mg twice daily as needed       atorvastatin (LIPITOR) 20 MG tablet Take 20 mg by mouth daily       Omi Protect (EUCERIN) external cream Apply topically 2 times daily And as needed       divalproex sodium delayed-release (DEPAKOTE SPRINKLE) 125 MG  "DR capsule Take 125 mg by mouth 3 times daily       levothyroxine (SYNTHROID/LEVOTHROID) 75 MCG tablet Take 0.5 tablets (37.5 mcg) by mouth daily       lisinopril (ZESTRIL) 10 MG tablet Take 10 mg by mouth daily       mirtazapine (REMERON) 15 MG tablet 1 TABLET ORALLY AT BEDTIME (DX: ANXIETY) 28 tablet 10     QUEtiapine (SEROQUEL) 50 MG tablet Take 50 mg by mouth 2 times daily And 50 mg once daily as needed       vitamin B-12 (CYANOCOBALAMIN) 500 MCG tablet Take 500 mcg by mouth daily       citalopram (CELEXA) 10 MG tablet 1 TABLET ORALLY EVERY MORNING (DX: ANXIETY) 28 tablet 10     REVIEW OF SYSTEMS:  Limited secondary to cognitive impairment but today pt reports as noted above.     Objective:  /62, HR 70, 94% on room air  Temp 97.4  F (36.3  C)   Ht 1.651 m (5' 5\")   Wt 74.4 kg (164 lb)   SpO2 94%   BMI 27.29 kg/m    Exam:  GENERAL APPEARANCE:  Alert, in no distress, cooperative  EYES: no redness or mattering  ENT: hearing acuity intact  NECK: supple, symmetrical  RESP: no respiratory distress, Lung sounds clear, patient is on room air  CV:  rate and rhythm regular. Edema 1+ in bilateral lower extremities. VASCULAR: warm extremities without open areas.  ABDOMEN: normal bowel sounds  M/S:   Gait and station: ambulates with hand held assist, able to move all extremities   SKIN:  Inspection and palpation of skin and subcutaneous tissue: skin warm, dry and intact without rashes but exam is limited  NEURO: no speech deficits and able to follow directions, moves all extremities symmetrically  PSYCH:  insight, judgement, and memory impaired, affect and mood normal    Labs:   From hospitalization on 5/26/21:  Mg 2.0  Na 144  K 3.9  Cl 113  Ca 8.3  Creatinine 0.79  BUN 19  Albumin 3.0  Protein 5.7    AST 62  TSH 0.64  HGB 11.4    She has refused repeat blood draws.    ASSESSMENT/PLAN:  Alzheimer's Dementia with psychosis  hallucinations  Is progressing and now severe. SLUMS is listed as 0/30 in the " [facility] system but wonder if she wasn't able to participate. Followed by geriatric psychiatrist, Andrzej Bone. She was started on depakote and has had some sleepiness with it. As of late, trazadone has been administered prior to morning cares to help with combativeness but she continued to remain intermittently combative so mirtazapine was added in, trazadone stopped on 5/28. She is still sleepy throughout the day mixed with periods of agitation during cares. She is calm and pleasant today. Spoke with Jose about starting citalopram to help with some of the anxiety in hopes that we can taper off from the depakote due to elevated LFTs  --start citalopram 10 mg daily  --continue with mirtazapine 15 mg daily  --continue with seroquel 50 mg BID and once daily PRN  --depakote 125 mg TID  --continue with memory care assisted living for assistance with cares, meals and medications.     Hypertension  Hyperlipidemia  BP controlled with reading of 122/62 today.   --continue with lisinopril 10 mg daily  --atorvastatin 20 mg daily  --Continue to monitor blood pressure and heart rate, adjust medications as needed.     Hypothyroidism  TSH of 0.64 on 5/25/21 which may be contributing to anxiety and combativeness. Dose was decreased last month.   --continue with levothyroxine to 37.5 mcg per day    Elevated LFTS  Wonder if depakote is causing this. Lab attempted to redraw x 2 but she declined bloodwork both times.   --will reattempt when behaviors are more stable.      Hx of Polio with right sided deficits  She is now able to ambulate with hand held assist.     Electronically signed by:  KENDRA Ledbetter CNP                 Sincerely,        KENDRA Ledbetter CNP

## 2021-06-16 NOTE — PROGRESS NOTES
[place] [facility]  [place] [facility] Record Number:  [id number]  Place of Service where encounter took place:  [facility] ([facility]) [[id number]]  Chief Complaint   Patient presents with     RECHECK       HPI:    Val Arnold  is a 81 year old (1940), who is being seen today for an episodic care visit.  HPI information obtained from: facility chart records, facility staff, patient report and family/first contact Jose report.    Ms. Arnold was visited today while in her room, sitting in the recliner,  present and sitting on the couch. Jose provides much of the history due to advancing dementia. He feels that behaviors may be improving slightly but notes she continues to be combative during cares. She is still sleepy throughout the day and often falls asleep after evening meal. She is able to ambulate with his hand held assist to the dining room from her room. Val denies pain. Speaks throughout our visit but it does not pertain to the conversation.     Spoke with PT today who reports that due to Val not participating during sessions, they will be discharging at this time. They note that she does have good strength but is not interested in doing sessions. During their session today, she was noted to be sitting on the floor and asked to be left there until her  came to the facility.      Past Medical and Surgical History reviewed in Epic today.    MEDICATIONS:  Current Outpatient Medications   Medication Sig Dispense Refill     acetaminophen (TYLENOL) 325 MG tablet Take 650 mg by mouth 2 times daily And 650 mg twice daily as needed       atorvastatin (LIPITOR) 20 MG tablet Take 20 mg by mouth daily       Omi Protect (EUCERIN) external cream Apply topically 2 times daily And as needed       divalproex sodium delayed-release (DEPAKOTE SPRINKLE) 125 MG DR capsule Take 125 mg by mouth 3 times daily       levothyroxine (SYNTHROID/LEVOTHROID) 75 MCG tablet Take  "0.5 tablets (37.5 mcg) by mouth daily       lisinopril (ZESTRIL) 10 MG tablet Take 10 mg by mouth daily       mirtazapine (REMERON) 15 MG tablet 1 TABLET ORALLY AT BEDTIME (DX: ANXIETY) 28 tablet 10     QUEtiapine (SEROQUEL) 50 MG tablet Take 50 mg by mouth 2 times daily And 50 mg once daily as needed       vitamin B-12 (CYANOCOBALAMIN) 500 MCG tablet Take 500 mcg by mouth daily       citalopram (CELEXA) 10 MG tablet 1 TABLET ORALLY EVERY MORNING (DX: ANXIETY) 28 tablet 10     REVIEW OF SYSTEMS:  Limited secondary to cognitive impairment but today pt reports as noted above.     Objective:  /62, HR 70, 94% on room air  Temp 97.4  F (36.3  C)   Ht 1.651 m (5' 5\")   Wt 74.4 kg (164 lb)   SpO2 94%   BMI 27.29 kg/m    Exam:  GENERAL APPEARANCE:  Alert, in no distress, cooperative  EYES: no redness or mattering  ENT: hearing acuity intact  NECK: supple, symmetrical  RESP: no respiratory distress, Lung sounds clear, patient is on room air  CV:  rate and rhythm regular. Edema 1+ in bilateral lower extremities. VASCULAR: warm extremities without open areas.  ABDOMEN: normal bowel sounds  M/S:   Gait and station: ambulates with hand held assist, able to move all extremities   SKIN:  Inspection and palpation of skin and subcutaneous tissue: skin warm, dry and intact without rashes but exam is limited  NEURO: no speech deficits and able to follow directions, moves all extremities symmetrically  PSYCH:  insight, judgement, and memory impaired, affect and mood normal    Labs:   From hospitalization on 5/26/21:  Mg 2.0  Na 144  K 3.9  Cl 113  Ca 8.3  Creatinine 0.79  BUN 19  Albumin 3.0  Protein 5.7    AST 62  TSH 0.64  HGB 11.4    She has refused repeat blood draws.    ASSESSMENT/PLAN:  Alzheimer's Dementia with psychosis  hallucinations  Is progressing and now severe. SLUMS is listed as 0/30 in the [employer] system but wonder if she wasn't able to participate. Followed by geriatric psychiatrist, Andrzej Bone. She " was started on depakote and has had some sleepiness with it. As of late, trazadone has been administered prior to morning cares to help with combativeness but she continued to remain intermittently combative so mirtazapine was added in, trazadone stopped on 5/28. She is still sleepy throughout the day mixed with periods of agitation during cares. She is calm and pleasant today. Spoke with Jose about starting citalopram to help with some of the anxiety in hopes that we can taper off from the depakote due to elevated LFTs  --start citalopram 10 mg daily  --continue with mirtazapine 15 mg daily  --continue with seroquel 50 mg BID and once daily PRN  --depakote 125 mg TID  --continue with memory care assisted living for assistance with cares, meals and medications.     Hypertension  Hyperlipidemia  BP controlled with reading of 122/62 today.   --continue with lisinopril 10 mg daily  --atorvastatin 20 mg daily  --Continue to monitor blood pressure and heart rate, adjust medications as needed.     Hypothyroidism  TSH of 0.64 on 5/25/21 which may be contributing to anxiety and combativeness. Dose was decreased last month.   --continue with levothyroxine to 37.5 mcg per day    Elevated LFTS  Wonder if depakote is causing this. Lab attempted to redraw x 2 but she declined bloodwork both times.   --will reattempt when behaviors are more stable.      Hx of Polio with right sided deficits  She is now able to ambulate with hand held assist.     Electronically signed by:  KENDRA Ledbetter CNP

## 2021-07-18 NOTE — LETTER
7/18/2021        [facility]  [address] [address]        [place] [facility]  [place] [facility] Record Number:  [id number]  Place of Service where encounter took place:  [facility] [place] ([facility]) [[id number]]  Chief Complaint   Patient presents with     Hospital F/U       HPI:    Val Arnold  is a 81 year old (1940), who is being seen today for an episodic care visit.  HPI information obtained from: facility chart records, facility staff, patient report and family/first contact Jose report.    Ms Arnold was hospitalized at [facility] 7/8/21-7/12/21 after being found by assisted living staff unresponsive with shaking right hand and head back with questionable left facial droop. Head CT negative, EEG negative but she was started on keppra for suspected focal seizure. Also started on cipro for UTI.     Upon return to the assisted living, Val was lethargic, remained in bed, not eating and unable to take medications. Hospice was consulted and she enrolled with [facility] on 7/15/21. Levetiracetam, Levocarnitine and Atorvastatin were discontinued given she could now crush and swallow them.     Ms. Arnold was visited today with  present for the visit, providing most of the history. He reports that Val is much more alert since stopping the medications as noted above. She does have times of irritability. Today she denies pain, she speaks throughout the visit but it is nonsensical. Jose reports that the broda chair has been helpful.      Past Medical and Surgical History reviewed in Epic today.    MEDICATIONS:  Current Outpatient Medications   Medication Sig Dispense Refill     acetaminophen (TYLENOL) 325 MG tablet Take 650 mg by mouth 2 times daily And 650 mg twice daily as needed       atorvastatin (LIPITOR) 20 MG tablet Take 20 mg by mouth daily       Omi Protect (EUCERIN) external cream Apply topically 2  times daily And as needed       citalopram (CELEXA) 10 MG tablet Take 1 tablet (10 mg) by mouth daily       citalopram (CELEXA) 10 MG tablet 1 TABLET ORALLY EVERY MORNING (DX: ANXIETY) 28 tablet 10     divalproex sodium delayed-release (DEPAKOTE SPRINKLE) 125 MG DR capsule Take 125 mg by mouth 3 times daily       levOCARNitine (CARNITOR) 330 MG tablet 3 TABLETS (990MG) ORALLY 3 TIMES DAILY WITH MEALS (DX: SEIZURES) 270 tablet 10     levothyroxine (SYNTHROID/LEVOTHROID) 75 MCG tablet Take 0.5 tablets (37.5 mcg) by mouth daily       lisinopril (ZESTRIL) 10 MG tablet Take 10 mg by mouth daily       mirtazapine (REMERON) 15 MG tablet 1 TABLET ORALLY AT BEDTIME (DX: ANXIETY) 28 tablet 10     QUEtiapine (SEROQUEL) 50 MG tablet Take 50 mg by mouth 2 times daily And 50 mg once daily as needed       vitamin B-12 (CYANOCOBALAMIN) 500 MCG tablet Take 500 mcg by mouth daily       REVIEW OF SYSTEMS:  Limited secondary to cognitive impairment but today pt reports as noted above.     Objective:  Exam:  GENERAL APPEARANCE:  Alert, in no distress, cooperative  EYES: no redness or mattering  ENT: hearing acuity intact  NECK: supple, symmetrical  RESP: no respiratory distress, Lung sounds clear, patient is on room air  CV:  rate and rhythm regular. Edema 1+ in bilateral lower extremities. VASCULAR: warm extremities without open areas.  ABDOMEN: normal bowel sounds  M/S:   Gait and station: in the broda chair, able to move all extremities   SKIN:  Inspection and palpation of skin and subcutaneous tissue: skin warm, dry and intact without rashes but exam is limited  NEURO: no speech deficits and able to follow directions, moves all extremities symmetrically  PSYCH:  insight, judgement, and memory impaired, affect and mood normal    Labs:   Reviewed in care everywhere    ASSESSMENT/PLAN:  Seizures  EEG negative but neurology concerned for She was started on keppra and levocarntine both of which were discontinued upon return to the AL  given she was lethargic and could not swallow. No further episodes of seizures were reported.   --hospice care.     Urinary Tract infection  >100 K of ecoli and started on ciprofloxacin  --continue with ciprofloxacin 250 mg BID with last dose on 7/19/21.    Alzheimer's Dementia   Hospice care patient  Is progressing and now severe. SLUMS is listed as 0/30 in the [employer] system but wonder if she wasn't able to participate. Followed by geriatric psychiatrist, Andrzej Bone. She was started on depakote and has had some sleepiness with it. As of late, trazadone has been administered prior to morning cares to help with combativeness but she continued to remain intermittently combative so mirtazapine was added in, trazadone stopped on 5/28. She is calm and pleasant today.   --continue with citalopram 10 mg daily  --continue with mirtazapine 15 mg daily  --continue with seroquel 50 mg BID and once daily PRN  --depakote 125 mg TID  --continue with memory care assisted living for assistance with cares, meals and medications.     Hypertension  BP controlled with reading of 122/62 today.   --continue with lisinopril 10 mg daily  --Continue to monitor blood pressure and heart rate, adjust medications as needed.     Hypothyroidism  --continue with levothyroxine to 37.5 mcg per day     Hx of Polio with right sided deficits  She is now in a broda chair.    Electronically signed by:  KENDRA Ledbetter CNP                 Sincerely,        KENDRA Ledbetter CNP

## 2021-07-24 NOTE — PROGRESS NOTES
[place] [facility]  [place] Medical Record Number:  [id number]  Place of Service where encounter took place:  [facility] ([facility]) [[id number]]  Chief Complaint   Patient presents with     Hospital F/U       HPI:    Val Arnold  is a 81 year old (1940), who is being seen today for an episodic care visit.  HPI information obtained from: facility chart records, facility staff, patient report and family/first contact Jose report.    Ms Arnold was hospitalized at [facility] 7/8/21-7/12/21 after being found by assisted living staff unresponsive with shaking right hand and head back with questionable left facial droop. Head CT negative, EEG negative but she was started on keppra for suspected focal seizure. Also started on cipro for UTI.     Upon return to the assisted living, Val was lethargic, remained in bed, not eating and unable to take medications. Hospice was consulted and she enrolled with [facility] on 7/15/21. Levetiracetam, Levocarnitine and Atorvastatin were discontinued given she could now crush and swallow them.     Ms. Arnold was visited today with  present for the visit, providing most of the history. He reports that Val is much more alert since stopping the medications as noted above. She does have times of irritability. Today she denies pain, she speaks throughout the visit but it is nonsensical. Jose reports that the broda chair has been helpful.      Past Medical and Surgical History reviewed in Epic today.    MEDICATIONS:  Current Outpatient Medications   Medication Sig Dispense Refill     acetaminophen (TYLENOL) 325 MG tablet Take 650 mg by mouth 2 times daily And 650 mg twice daily as needed       atorvastatin (LIPITOR) 20 MG tablet Take 20 mg by mouth daily       Omi Protect (EUCERIN) external cream Apply topically 2 times daily And as needed       citalopram (CELEXA) 10 MG tablet Take 1 tablet (10 mg) by mouth daily        citalopram (CELEXA) 10 MG tablet 1 TABLET ORALLY EVERY MORNING (DX: ANXIETY) 28 tablet 10     divalproex sodium delayed-release (DEPAKOTE SPRINKLE) 125 MG DR capsule Take 125 mg by mouth 3 times daily       levOCARNitine (CARNITOR) 330 MG tablet 3 TABLETS (990MG) ORALLY 3 TIMES DAILY WITH MEALS (DX: SEIZURES) 270 tablet 10     levothyroxine (SYNTHROID/LEVOTHROID) 75 MCG tablet Take 0.5 tablets (37.5 mcg) by mouth daily       lisinopril (ZESTRIL) 10 MG tablet Take 10 mg by mouth daily       mirtazapine (REMERON) 15 MG tablet 1 TABLET ORALLY AT BEDTIME (DX: ANXIETY) 28 tablet 10     QUEtiapine (SEROQUEL) 50 MG tablet Take 50 mg by mouth 2 times daily And 50 mg once daily as needed       vitamin B-12 (CYANOCOBALAMIN) 500 MCG tablet Take 500 mcg by mouth daily       REVIEW OF SYSTEMS:  Limited secondary to cognitive impairment but today pt reports as noted above.     Objective:  Exam:  GENERAL APPEARANCE:  Alert, in no distress, cooperative  EYES: no redness or mattering  ENT: hearing acuity intact  NECK: supple, symmetrical  RESP: no respiratory distress, Lung sounds clear, patient is on room air  CV:  rate and rhythm regular. Edema 1+ in bilateral lower extremities. VASCULAR: warm extremities without open areas.  ABDOMEN: normal bowel sounds  M/S:   Gait and station: in the broda chair, able to move all extremities   SKIN:  Inspection and palpation of skin and subcutaneous tissue: skin warm, dry and intact without rashes but exam is limited  NEURO: no speech deficits and able to follow directions, moves all extremities symmetrically  PSYCH:  insight, judgement, and memory impaired, affect and mood normal    Labs:   Reviewed in care everywhere    ASSESSMENT/PLAN:  Seizures  EEG negative but neurology concerned for She was started on keppra and levocarntine both of which were discontinued upon return to the AL given she was lethargic and could not swallow. No further episodes of seizures were reported.   --hospice care.      Urinary Tract infection  >100 K of ecoli and started on ciprofloxacin  --continue with ciprofloxacin 250 mg BID with last dose on 7/19/21.    Alzheimer's Dementia   Hospice care patient  Is progressing and now severe. SLUMS is listed as 0/30 in the [employer] system but wonder if she wasn't able to participate. Followed by geriatric psychiatrist, Andrzej Bone. She was started on depakote and has had some sleepiness with it. As of late, trazadone has been administered prior to morning cares to help with combativeness but she continued to remain intermittently combative so mirtazapine was added in, trazadone stopped on 5/28. She is calm and pleasant today.   --continue with citalopram 10 mg daily  --continue with mirtazapine 15 mg daily  --continue with seroquel 50 mg BID and once daily PRN  --depakote 125 mg TID  --continue with memory care assisted living for assistance with cares, meals and medications.     Hypertension  BP controlled with reading of 122/62 today.   --continue with lisinopril 10 mg daily  --Continue to monitor blood pressure and heart rate, adjust medications as needed.     Hypothyroidism  --continue with levothyroxine to 37.5 mcg per day     Hx of Polio with right sided deficits  She is now in a broda chair.    Electronically signed by:  KENDRA Ledbetter CNP

## 2021-09-15 NOTE — PROGRESS NOTES
[place] [facility]  [place] Medical Record Number:  [id number]  Place of Service where encounter took place:  [facility] ([facility]) [[id number]]  Chief Complaint   Patient presents with     RECHECK       HPI:    Val Arnold  is a 81 year old (1940), who is being seen today for an episodic care visit.  HPI information obtained from: facility chart records, facility staff, patient report and family/first contact Artemio report.    Ms. Arnold was visited today while in her broda chair, sitting in the dining room. She is unable to provide hx due to advanced dementia.  She speaks frequently during the visit but it is nonsensical. Spoke with  today who reports that Val is tired often during the day so we discussed cutting down on medications which he agrees with. He asks if she would be able to walk soon which is unlikely given she has been in the broda chair for several weeks.      Past Medical and Surgical History reviewed in Epic today.    MEDICATIONS:  Current Outpatient Medications   Medication Sig Dispense Refill     QUEtiapine (SEROQUEL) 50 MG tablet Take 0.5 tablets (25 mg) by mouth 2 times daily And 25 mg once daily as needed       acetaminophen (TYLENOL) 325 MG tablet Take 650 mg by mouth 2 times daily And 650 mg twice daily as needed       Omi Protect (EUCERIN) external cream Apply topically 2 times daily And as needed       citalopram (CELEXA) 10 MG tablet 1 TABLET ORALLY EVERY MORNING (DX: ANXIETY) 28 tablet 10     divalproex sodium delayed-release (DEPAKOTE SPRINKLE) 125 MG DR capsule Take 125 mg by mouth 3 times daily       haloperidol (HALDOL) 0.5 MG tablet Take 1 tablet (0.5 mg) by mouth daily as needed for agitation       levothyroxine (SYNTHROID/LEVOTHROID) 75 MCG tablet Take 0.5 tablets (37.5 mcg) by mouth daily       mirtazapine (REMERON) 15 MG tablet 1 TABLET ORALLY AT BEDTIME (DX: ANXIETY) 28 tablet 10     morphine 2.5 MG solu-tab Take 1 tablet  (2.5 mg) by mouth every hour as needed for shortness of breath / dyspnea or moderate to severe pain       vitamin B-12 (CYANOCOBALAMIN) 500 MCG tablet Take 500 mcg by mouth daily       REVIEW OF SYSTEMS:  Limited secondary to cognitive impairment but today pt reports as noted above.     Objective:  Exam:  GENERAL APPEARANCE:  Alert, in no distress, cooperative  EYES: no redness or mattering  ENT: hearing acuity intact  NECK: supple, symmetrical  RESP: no respiratory distress, Lung sounds clear, patient is on room air  CV:  rate and rhythm regular. Edema 1+ in bilateral lower extremities. VASCULAR: warm extremities without open areas.  ABDOMEN: normal bowel sounds  M/S:   Gait and station: in the broda chair, able to move all extremities   SKIN:  Inspection and palpation of skin and subcutaneous tissue: skin warm, dry and intact without rashes but exam is limited  NEURO: no speech deficits and able to follow directions, moves all extremities symmetrically  PSYCH:  insight, judgement, and memory impaired, affect and mood normal    Labs:   Reviewed in care everywhere    ASSESSMENT/PLAN:  Alzheimer's Dementia   Hospice care patient  Is progressing and now severe. SLUMS is listed as 0/30 in the [employer] system. She is calm and pleasant today, appears comfortable. Talking but nonsensical. Has lost 20 lbs since March 2021 so continues to decline.    --continue with citalopram 10 mg daily  --continue with mirtazapine 15 mg daily  --decrease seroquel to 25 mg BID and once daily PRN  --depakote 125 mg TID  --continue with memory care assisted living for assistance with cares, meals and medications.     Electronically signed by:  KENDRA Ledbetter CNP

## 2021-11-04 NOTE — LETTER
11/4/2021        RE: Val Arnold  [facility]  [address] [address]        [facility]    Chief Complaint   Patient presents with     RECHECK     HPI:  Val Arnold is a 81 year old  (1940), who is being seen today for an episodic care visit at: [facility] [[id number]]. Today's concern is:     Diagnoses       Codes Comments    Essential hypertension    -  Primary I10     Hypothyroidism, unspecified type     E03.9     Late effects of acute poliomyelitis     B91     Hallucinations     R44.3     Dementia associated with other underlying disease with behavioral disturbance (H)     F02.81         Came to meet Val for the first time as new to facility.  Received a note that she had a fall with no injuries.    Spouse was present today as he comes to help feed her twice a day.  Nice to speak with him to learn about Val as cognitively she does not make her needs known.  She will saw words but she is in her world.  Spouse stated she will have hallucinations which she did look up to the ceiling and around asking questions.    Is in a broda chair from [facility].  Appears comfortable but does not engage in conversation or make eye contact.    Reviewing her medications.  See she is on two different anti-psych medications.  Would like only to have one available.      Allergies, and PMH/PSH reviewed in [facility] today.    Current Outpatient Medications   Medication Sig Dispense Refill     QUEtiapine (SEROQUEL) 50 MG tablet Take 0.5 tablets (25 mg) by mouth 2 times daily. May also take 0.5 tablets (25 mg) 4 times daily as needed (hallucinations). And 25 mg once daily as needed       acetaminophen (TYLENOL) 325 MG tablet Take 650 mg by mouth 2 times daily And 650 mg twice daily as needed       Omi Protect (EUCERIN) external cream Apply topically 2 times daily And as needed       citalopram (CELEXA) 10 MG tablet 1 TABLET  "ORALLY EVERY MORNING (DX: ANXIETY) 28 tablet 10     divalproex sodium delayed-release (DEPAKOTE SPRINKLE) 125 MG DR capsule Take 125 mg by mouth 3 times daily       levothyroxine (SYNTHROID/LEVOTHROID) 75 MCG tablet Take 0.5 tablets (37.5 mcg) by mouth daily       mirtazapine (REMERON) 15 MG tablet 1 TABLET ORALLY AT BEDTIME (DX: ANXIETY) 28 tablet 10     morphine 2.5 MG solu-tab Take 1 tablet (2.5 mg) by mouth every hour as needed for shortness of breath / dyspnea or moderate to severe pain       vitamin B-12 (CYANOCOBALAMIN) 500 MCG tablet Take 500 mcg by mouth daily         REVIEW OF SYSTEMS:  Unobtainable secondary to cognitive impairment.     Objective:   /64   Pulse 66   Temp 98.5  F (36.9  C)   Resp 17   Ht 1.651 m (5' 5\")   Wt 84.8 kg (187 lb)   SpO2 96%   BMI 31.12 kg/m    Val is sitting in a Brada Chair in which makes her look comfortable.  Abundant amount of hair as well as facial hair on chin.  Skin is pale, dry and warm.  Heart rate regular and strong.  No edema.  No oxygen used nor respiratory distress.    According to the bowel record, she may not have a BM in 3-4 days.    Dependent on staff for mobility due to right side affected by polio.  Use a stand up lift.  Someone pushes her broda chair.  Blood pressures range from 120-130's/50-60's.      Most Recent 3 CBC's:No lab results found.  Most Recent 3 BMP's:Recent Labs   Lab Test 11/18/19  0940      POTASSIUM 4.3   CHLORIDE 109*   CO2 26   BUN 20   CR 0.95   ANIONGAP 8   ISMAEL 9.5   GLC 78     Lab Results   Component Value Date    TSH 1.35 11/18/2019       Assessment/Plan:  (I10) Essential hypertension  (primary encounter diagnosis)  Comment: Val is not on any hypertensive medications and seems to be doing well.  No specific set of vitals to be done as she resides in a AL facility.    (E03.9) Hypothyroidism, unspecified type  Comment: currently on Levothyroxine 37.5mcg daily.    Needs to have a updated lab value - maybe does not " need the medication?  Would like to see the lab value near the high end of normal or above.      (K59.01) Slow transit constipation  Comment: noted that BMs are not recorded regularly.  With that will schedule a dose of Senna-S 1 tab daily.  Still has a PRN dosing.  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  Feel with cares she will be difficult as she is not aware of what is occurring and so could scared.  Does not ambulate.  No changes.    (R44.3) Hallucinations  (F02.81) Dementia associated with other underlying disease with behavioral disturbance (H)  Comment: Val is on Seroquel 25mg twice a day.  Verifying her medications against Epic to her chart there was dependencies.  Was going to discontinue the Haldol but she is on hospice and they like to use that medication.    Decision is to the discontinue PRN Seroquel order, keep the Haldol 0.5mg every 4 hours prn.        Otherwise Val appears to be cared for by spouse and staff.  He is up at the AL most of the day.    Plan: haloperidol (HALDOL) 0.5 MG tablet, QUEtiapine         (SEROQUEL) 50 MG tablet, DISCONTINUED:         QUEtiapine (SEROQUEL) 50 MG tablet        Orders:  1.  Add senna-S 1 tab daily for constipation  2.  TSH level next lab day due to check value - hypothyroidism.  3.  Discontinue PRN Seroquel.  - just stay with one prn antipsychotic.    Electronically signed by: KENDRA Ingram CNP             Sincerely,        KNEDRA Ingram CNP

## 2021-11-04 NOTE — PROGRESS NOTES
[facility] GERIATRIC SERVICES    Chief Complaint   Patient presents with     RECHECK     HPI:  Val Arnold is a 81 year old  (1940), who is being seen today for an episodic care visit at: [facility] [[id number]]. Today's concern is:     Diagnoses       Codes Comments    Essential hypertension    -  Primary I10     Hypothyroidism, unspecified type     E03.9     Late effects of acute poliomyelitis     B91     Hallucinations     R44.3     Dementia associated with other underlying disease with behavioral disturbance (H)     F02.81         Came to meet Val for the first time as new to facility.  Received a note that she had a fall with no injuries.    Spouse was present today as he comes to help feed her twice a day.  Nice to speak with him to learn about Val as cognitively she does not make her needs known.  She will saw words but she is in her world.  Spouse stated she will have hallucinations which she did look up to the ceiling and around asking questions.    Is in a broda chair from [facility].  Appears comfortable but does not engage in conversation or make eye contact.    Reviewing her medications.  See she is on two different anti-psych medications.  Would like only to have one available.      Allergies, and PMH/PSH reviewed in EPIC today.    Current Outpatient Medications   Medication Sig Dispense Refill     QUEtiapine (SEROQUEL) 50 MG tablet Take 0.5 tablets (25 mg) by mouth 2 times daily. May also take 0.5 tablets (25 mg) 4 times daily as needed (hallucinations). And 25 mg once daily as needed       acetaminophen (TYLENOL) 325 MG tablet Take 650 mg by mouth 2 times daily And 650 mg twice daily as needed       Omi Protect (EUCERIN) external cream Apply topically 2 times daily And as needed       citalopram (CELEXA) 10 MG tablet 1 TABLET ORALLY EVERY MORNING (DX: ANXIETY) 28 tablet 10     divalproex sodium delayed-release (DEPAKOTE SPRINKLE) 125  "MG DR capsule Take 125 mg by mouth 3 times daily       levothyroxine (SYNTHROID/LEVOTHROID) 75 MCG tablet Take 0.5 tablets (37.5 mcg) by mouth daily       mirtazapine (REMERON) 15 MG tablet 1 TABLET ORALLY AT BEDTIME (DX: ANXIETY) 28 tablet 10     morphine 2.5 MG solu-tab Take 1 tablet (2.5 mg) by mouth every hour as needed for shortness of breath / dyspnea or moderate to severe pain       vitamin B-12 (CYANOCOBALAMIN) 500 MCG tablet Take 500 mcg by mouth daily         REVIEW OF SYSTEMS:  Unobtainable secondary to cognitive impairment.     Objective:   /64   Pulse 66   Temp 98.5  F (36.9  C)   Resp 17   Ht 1.651 m (5' 5\")   Wt 84.8 kg (187 lb)   SpO2 96%   BMI 31.12 kg/m    Val is sitting in a Brada Chair in which makes her look comfortable.  Abundant amount of hair as well as facial hair on chin.  Skin is pale, dry and warm.  Heart rate regular and strong.  No edema.  No oxygen used nor respiratory distress.    According to the bowel record, she may not have a BM in 3-4 days.    Dependent on staff for mobility due to right side affected by polio.  Use a stand up lift.  Someone pushes her broda chair.  Blood pressures range from 120-130's/50-60's.      Most Recent 3 CBC's:No lab results found.  Most Recent 3 BMP's:Recent Labs   Lab Test 11/18/19  0940      POTASSIUM 4.3   CHLORIDE 109*   CO2 26   BUN 20   CR 0.95   ANIONGAP 8   ISMAEL 9.5   GLC 78     Lab Results   Component Value Date    TSH 1.35 11/18/2019       Assessment/Plan:  (I10) Essential hypertension  (primary encounter diagnosis)  Comment: Val is not on any hypertensive medications and seems to be doing well.  No specific set of vitals to be done as she resides in a AL facility.    (E03.9) Hypothyroidism, unspecified type  Comment: currently on Levothyroxine 37.5mcg daily.    Needs to have a updated lab value - maybe does not need the medication?  Would like to see the lab value near the high end of normal or above.      (K59.01) " Slow transit constipation  Comment: noted that BMs are not recorded regularly.  With that will schedule a dose of Senna-S 1 tab daily.  Still has a PRN dosing.  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  Feel with cares she will be difficult as she is not aware of what is occurring and so could scared.  Does not ambulate.  No changes.    (R44.3) Hallucinations  (F02.81) Dementia associated with other underlying disease with behavioral disturbance (H)  Comment: Val is on Seroquel 25mg twice a day.  Verifying her medications against Epic to her chart there was dependencies.  Was going to discontinue the Haldol but she is on hospice and they like to use that medication.    Decision is to the discontinue PRN Seroquel order, keep the Haldol 0.5mg every 4 hours prn.        Otherwise Val appears to be cared for by spouse and staff.  He is up at the AL most of the day.    Plan: haloperidol (HALDOL) 0.5 MG tablet, QUEtiapine         (SEROQUEL) 50 MG tablet, DISCONTINUED:         QUEtiapine (SEROQUEL) 50 MG tablet        Orders:  1.  Add senna-S 1 tab daily for constipation  2.  TSH level next lab day due to check value - hypothyroidism.  3.  Discontinue PRN Seroquel.  - just stay with one prn antipsychotic.    Electronically signed by: KENDRA Ingram CNP

## 2021-11-09 NOTE — TELEPHONE ENCOUNTER
[facility] Geriatrics Lab Note     Provider: KENDRA Kebede CNP  Facility: [facility] Facility Type:  AL    Allergies   Allergen Reactions     Codeine Unknown       Labs Reviewed by provider: TSH and Free T4. Currently on Levothyroxine 37.5mcg every day.            Verbal Order/Direction given by Provider: PAUL. Sent labs to provider to review.    Provider giving Order:  KENDRA Kebede CNP    Verbal Order given to: Alicia Lay RN

## 2024-06-20 NOTE — LETTER
9/15/2021        RE: Val Arnold  [facility]  [address] [address]        [place] [facility]  [place] Medical Record Number:  [id number]  Place of Service where encounter took place:  [facility] [address] [facility] [[id number]]  Chief Complaint   Patient presents with     RECHECK       HPI:    Val Arnold  is a 81 year old (1940), who is being seen today for an episodic care visit.  HPI information obtained from: facility chart records, facility staff, patient report and family/first contact Artemio report.    Ms. Arnold was visited today while in her broda chair, sitting in the dining room. She is unable to provide hx due to advanced dementia.  She speaks frequently during the visit but it is nonsensical. Spoke with  today who reports that Val is tired often during the day so we discussed cutting down on medications which he agrees with. He asks if she would be able to walk soon which is unlikely given she has been in the broda chair for several weeks.      Past Medical and Surgical History reviewed in Epic today.    MEDICATIONS:  Current Outpatient Medications   Medication Sig Dispense Refill     QUEtiapine (SEROQUEL) 50 MG tablet Take 0.5 tablets (25 mg) by mouth 2 times daily And 25 mg once daily as needed       acetaminophen (TYLENOL) 325 MG tablet Take 650 mg by mouth 2 times daily And 650 mg twice daily as needed       Omi Protect (EUCERIN) external cream Apply topically 2 times daily And as needed       citalopram (CELEXA) 10 MG tablet 1 TABLET ORALLY EVERY MORNING (DX: ANXIETY) 28 tablet 10     divalproex sodium delayed-release (DEPAKOTE SPRINKLE) 125 MG DR capsule Take 125 mg by mouth 3 times daily       haloperidol (HALDOL) 0.5 MG tablet Take 1 tablet (0.5 mg) by mouth daily as needed for agitation       levothyroxine (SYNTHROID/LEVOTHROID) 75 MCG tablet Take 0.5 tablets (37.5 mcg) by mouth daily       mirtazapine (REMERON) 15  MG tablet 1 TABLET ORALLY AT BEDTIME (DX: ANXIETY) 28 tablet 10     morphine 2.5 MG solu-tab Take 1 tablet (2.5 mg) by mouth every hour as needed for shortness of breath / dyspnea or moderate to severe pain       vitamin B-12 (CYANOCOBALAMIN) 500 MCG tablet Take 500 mcg by mouth daily       REVIEW OF SYSTEMS:  Limited secondary to cognitive impairment but today pt reports as noted above.     Objective:  Exam:  GENERAL APPEARANCE:  Alert, in no distress, cooperative  EYES: no redness or mattering  ENT: hearing acuity intact  NECK: supple, symmetrical  RESP: no respiratory distress, Lung sounds clear, patient is on room air  CV:  rate and rhythm regular. Edema 1+ in bilateral lower extremities. VASCULAR: warm extremities without open areas.  ABDOMEN: normal bowel sounds  M/S:   Gait and station: in the broda chair, able to move all extremities   SKIN:  Inspection and palpation of skin and subcutaneous tissue: skin warm, dry and intact without rashes but exam is limited  NEURO: no speech deficits and able to follow directions, moves all extremities symmetrically  PSYCH:  insight, judgement, and memory impaired, affect and mood normal    Labs:   Reviewed in care everywhere    ASSESSMENT/PLAN:  Alzheimer's Dementia   Hospice care patient  Is progressing and now severe. SLUMS is listed as 0/30 in the [employer] system. She is calm and pleasant today, appears comfortable. Talking but nonsensical. Has lost 20 lbs since March 2021 so continues to decline.    --continue with citalopram 10 mg daily  --continue with mirtazapine 15 mg daily  --decrease seroquel to 25 mg BID and once daily PRN  --depakote 125 mg TID  --continue with memory care assisted living for assistance with cares, meals and medications.     Electronically signed by:  KENDRA Ledbetter CNP                   Sincerely,        KENDRA Ledbetter CNP     Home